# Patient Record
Sex: MALE | Race: WHITE | NOT HISPANIC OR LATINO | Employment: OTHER | URBAN - METROPOLITAN AREA
[De-identification: names, ages, dates, MRNs, and addresses within clinical notes are randomized per-mention and may not be internally consistent; named-entity substitution may affect disease eponyms.]

---

## 2018-05-30 ENCOUNTER — TRANSFERRED RECORDS (OUTPATIENT)
Dept: HEALTH INFORMATION MANAGEMENT | Facility: CLINIC | Age: 63
End: 2018-05-30

## 2018-12-12 ENCOUNTER — TRANSFERRED RECORDS (OUTPATIENT)
Dept: HEALTH INFORMATION MANAGEMENT | Facility: CLINIC | Age: 63
End: 2018-12-12

## 2019-09-04 ENCOUNTER — TRANSFERRED RECORDS (OUTPATIENT)
Dept: HEALTH INFORMATION MANAGEMENT | Facility: CLINIC | Age: 64
End: 2019-09-04

## 2020-02-03 ENCOUNTER — TRANSFERRED RECORDS (OUTPATIENT)
Dept: HEALTH INFORMATION MANAGEMENT | Facility: CLINIC | Age: 65
End: 2020-02-03

## 2020-02-12 ENCOUNTER — TRANSCRIBE ORDERS (OUTPATIENT)
Dept: OTHER | Age: 65
End: 2020-02-12

## 2020-02-12 DIAGNOSIS — M79.673 CHRONIC FOOT PAIN: Primary | ICD-10-CM

## 2020-02-12 DIAGNOSIS — Q66.229 METATARSUS ADDUCTUS: ICD-10-CM

## 2020-02-12 DIAGNOSIS — G89.29 CHRONIC FOOT PAIN: Primary | ICD-10-CM

## 2020-02-12 DIAGNOSIS — M65.972 TENOSYNOVITIS OF LEFT FOOT: ICD-10-CM

## 2020-02-19 ENCOUNTER — DOCUMENTATION ONLY (OUTPATIENT)
Dept: ORTHOPEDICS | Facility: CLINIC | Age: 65
End: 2020-02-19

## 2020-02-19 NOTE — PROGRESS NOTES
Strong possibility of duplicate chart with [phone]. Appropriate staff has been notified to merge accounts.

## 2020-02-21 ENCOUNTER — TRANSCRIBE ORDERS (OUTPATIENT)
Dept: OTHER | Age: 65
End: 2020-02-21

## 2020-02-21 DIAGNOSIS — M65.4 RADIAL STYLOID TENOSYNOVITIS OF LEFT HAND: ICD-10-CM

## 2020-02-21 DIAGNOSIS — Q66.229 METATARSUS ADDUCTUS: ICD-10-CM

## 2020-02-21 DIAGNOSIS — M79.673 CHRONIC FOOT PAIN, UNSPECIFIED LATERALITY: Primary | ICD-10-CM

## 2020-02-21 DIAGNOSIS — G89.29 CHRONIC FOOT PAIN, UNSPECIFIED LATERALITY: Primary | ICD-10-CM

## 2020-02-27 ENCOUNTER — DOCUMENTATION ONLY (OUTPATIENT)
Dept: CARE COORDINATION | Facility: CLINIC | Age: 65
End: 2020-02-27

## 2020-03-20 ENCOUNTER — TELEPHONE (OUTPATIENT)
Dept: ORTHOPEDICS | Facility: CLINIC | Age: 65
End: 2020-03-20

## 2020-03-20 NOTE — TELEPHONE ENCOUNTER
I called the patient this morning and LVM to let the patient know that he will need to reschedule his appointment out reschedule 5-8 weeks due to the COVID 19 virus. I gave him our number to call and reschedule.    Lynn Peña, ATC

## 2020-04-22 ENCOUNTER — TELEPHONE (OUTPATIENT)
Dept: ORTHOPEDICS | Facility: CLINIC | Age: 65
End: 2020-04-22

## 2020-04-22 NOTE — TELEPHONE ENCOUNTER
Called patient to offer sooner video visit with Dr. Corona. LVM to call us back.    Change to sooner video visit.    Lynn Peña, ATC

## 2020-04-23 NOTE — TELEPHONE ENCOUNTER
Called the patient back to discuss visit. Patient reports that he has previously seen 4 other podiatrist thus far. He is wondering if a video visit is even worth the time. I discussed what we can offer via video. Had an in depth conversation with patient wife who is retired Physical Therapist. After long discussion he decided to wait till he could have an in person visit. He will keep in mind that he can do a video visit at any point if he would like.    Lynn Peña, ATC

## 2020-04-23 NOTE — TELEPHONE ENCOUNTER
[facility] Call Center    Phone Message    May a detailed message be left on voicemail: yes     Reason for Call: Other: Pt is requesting a call back please. He is wanting to discuss if a video visit would be truly helpful for his case since Dr. Corona will be the 5th podiatrist he is seeing for this. Please reach out to Pt to discuss.      Action Taken: Message routed to:  [facility] ([facility] Ortho    Travel Screening: Not Applicable

## 2020-05-29 ENCOUNTER — VIRTUAL VISIT (OUTPATIENT)
Dept: ORTHOPEDICS | Facility: CLINIC | Age: 65
End: 2020-05-29
Attending: FAMILY MEDICINE
Payer: COMMERCIAL

## 2020-05-29 ENCOUNTER — TELEPHONE (OUTPATIENT)
Dept: PODIATRY | Facility: CLINIC | Age: 65
End: 2020-05-29

## 2020-05-29 DIAGNOSIS — G57.81 SURAL NEURITIS, RIGHT: Primary | ICD-10-CM

## 2020-05-29 DIAGNOSIS — G57.81 NEURITIS OF RIGHT SURAL NERVE: Primary | ICD-10-CM

## 2020-05-29 RX ORDER — ATORVASTATIN CALCIUM 40 MG/1
40 TABLET, FILM COATED ORAL
COMMUNITY
Start: 2019-06-13

## 2020-05-29 RX ORDER — ATENOLOL 50 MG/1
TABLET ORAL
COMMUNITY
Start: 2020-01-21

## 2020-05-29 RX ORDER — AMLODIPINE BESYLATE 2.5 MG/1
TABLET ORAL
COMMUNITY
Start: 2019-04-12

## 2020-05-29 RX ORDER — ALLOPURINOL 300 MG/1
TABLET ORAL
COMMUNITY
Start: 2020-04-24

## 2020-05-29 NOTE — TELEPHONE ENCOUNTER
5/29 Called and left voicemail. Provided phone number [phone] to schedule emg on June 22nd in [place]. Patient also needs to schedule follow up with Dr. Corona on June 24.     Alee Ansari   Procedure    Ortho/Sports Med/Ent/Eye   [facility]   [phone]  ----- Message -----  From: Parker Corona DPM  Sent: 5/29/2020  11:25 AM CDT  To: Alee Ansari    For the above patient, can you help them get scheduled for an EMG at [facility] and put them, on my [facility] schedule on June 24th?    Thanks!    AC

## 2020-05-29 NOTE — PROGRESS NOTES
"Ramana Kearney is a 64 year old male who is being evaluated via a billable video visit.      The patient has been notified of following:     \"This video visit will be conducted via a call between you and your physician/provider. We have found that certain health care needs can be provided without the need for an in-person physical exam.  This service lets us provide the care you need with a video conversation.  If a prescription is necessary we can send it directly to your pharmacy.  If lab work is needed we can place an order for that and you can then stop by our lab to have the test done at a later time.    Video visits are billed at different rates depending on your insurance coverage.  Please reach out to your insurance provider with any questions.    If during the course of the call the physician/provider feels a video visit is not appropriate, you will not be charged for this service.\"    Patient has given verbal consent for Video visit? Yes    How would you like to obtain your AVS? Mail a copy    Patient would like the video invitation sent by: email: [email or web address]    Will anyone else be joining your video visit? No        Video-Visit Details    Type of service:  Video Visit    Video Start Time: 1000  Video End Time: 10:44 AM    Originating Location (pt. Location): Home    Distant Location (provider location):  [facility]     Platform used for Video Visit: Vijay Corona DPM    Date of Service: 5/29/2020    Chief Complaint:   Chief Complaint   Patient presents with     Appointment     RIght foot pain 5th metatarsal and cuboid pain         HPI: Ramana is a 64 year old male who presents today via video with his wife with right foot pain. He has a complicated history with this foot. Relates [place] miles into the journey when he started to get foot pain in the right. He continued on some, but " was forced to come home. Relates that after that, he saw multiple doctors and had multiple diagnoses. He has had numerous injections into the foot for neuromas and joint pain. He has had PT on the feet. Relates that these helped somewhat, but he continues to have persistent pain in the right cuboid area. Relates that PT was manipulating the cuboid and this would relieve the pain x 12 hours and then it would return. He described the pain as sharp/dull/burning/electric.     Review of Systems: No n/v/d/f/c/nd/sob/cp     PMH: No past medical history on file.    PSxH: No past surgical history on file.    Allergies: Soy allergy    SH:   Social History     Socioeconomic History     Marital status:      Spouse name: Not on file     Number of children: Not on file     Years of education: Not on file     Highest education level: Not on file   Occupational History     Not on file   Social Needs     Financial resource strain: Not on file     Food insecurity     Worry: Not on file     Inability: Not on file     Transportation needs     Medical: Not on file     Non-medical: Not on file   Tobacco Use     Smoking status: Not on file   Substance and Sexual Activity     Alcohol use: Not on file     Drug use: Not on file     Sexual activity: Not on file   Lifestyle     Physical activity     Days per week: Not on file     Minutes per session: Not on file     Stress: Not on file   Relationships     Social connections     Talks on phone: Not on file     Gets together: Not on file     Attends Alevism service: Not on file     Active member of club or organization: Not on file     Attends meetings of clubs or organizations: Not on file     Relationship status: Not on file     Intimate partner violence     Fear of current or ex partner: Not on file     Emotionally abused: Not on file     Physically abused: Not on file     Forced sexual activity: Not on file   Other Topics Concern     Not on file   Social History Narrative     Not on  file       FH: No family history on file.    Objective:  He notes pain in the base of the cuboid, laterally. With percussion of the area, he has + tinel's and Valleix sign. No pain with 5th MTPJ passive or active ROM.   No pain with percussion of the common peroneal nerve. Pain with foot eversion. Some back pain.     No x-rays indicated during today's visit  Previous films were reviewed today, independent visualization of images was performed, and results were discussed with the patient      Assessment: Ramana is a 65 yo male, previously very active with pain in the right foot that has not responded to treatment. I reviewed his pervious images and he does not appear to have a mechanical or anatomical problem. Possibly Sural neuritis. Will order EMG.      Plan:  - Pt seen and evaluated  - Previous studies reviewed.  - Will order EMG.  - See again in [facility] s/p EMG.

## 2020-06-22 ENCOUNTER — OFFICE VISIT (OUTPATIENT)
Dept: NEUROLOGY | Facility: CLINIC | Age: 65
End: 2020-06-22
Payer: COMMERCIAL

## 2020-06-22 DIAGNOSIS — G57.81 NEURITIS OF RIGHT SURAL NERVE: ICD-10-CM

## 2020-06-22 DIAGNOSIS — M79.671 RIGHT FOOT PAIN: Primary | ICD-10-CM

## 2020-06-22 PROCEDURE — 95885 MUSC TST DONE W/NERV TST LIM: CPT | Performed by: PSYCHIATRY & NEUROLOGY

## 2020-06-22 PROCEDURE — 95911 NRV CNDJ TEST 9-10 STUDIES: CPT | Performed by: PSYCHIATRY & NEUROLOGY

## 2020-06-22 NOTE — PROGRESS NOTES
[facility] EMG Laboratory      Nerve Conduction & EMG Report          Patient:       Ramana Kearney  Patient ID:    [id number]  Gender:        Male  YOB: 1955  Age:           64 Years 8 Months      History and Examination:  Ramana Kearney is a 64 year old man with sharp pain over the lateral aspect of the left foot, exacerbated by pressure or weight-bearing. Examination recapitulates this history. Achilles reflexes are present. He also has a past history of intermittent foot numbness with prolonged running as well as cervical spine injury, with good recovery. He is referred for evaluation of possible sural neuropathy or other local neurogenic etiology of his symptoms.    Techniques:  Motor conduction studies were done with surface recording electrodes. Sensory conduction studies were performed with surface electrodes, unless indicated otherwise by (n), designating the use of subdermal recording electrodes. Temperature was monitored and recorded throughout the study. Lower extremities were maintained at a temperature of 31 degrees Centigrade or higher. EMG was done with a concentric needle electrode.     Results:  Bilateral sural and superficial peroneal sensory conduction studies were normal. Medial and lateral plantar nerve action potentials were absent bilaterally. Right peroneal and tibial motor conduction studies were normal. The right tibial minimum F-response latency was normal. Electromyography of right intrinsic foot muscles was normal.     Interpretation:  This study demonstrates no diagnostic abnormalities; in particular:  1. There is no evidence of sural neuropathy or other mononeuropathy.  2. 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  Furthermore its relevance in the context of a unilateral syndrome is doubtful.       Otis Mendoza MD        Sensory NCS      Nerve / Sites Rec. Site Onset Peak NP Amp Ref. PP Amp Dist Zacarias Ref. Temp     ms ms  V  V  V cm m/s m/s  C   R SURAL - Lat Mall 60      Calf Ankle 2.81 3.80 8.8 5.0 6.2 14 49.8  32.2   L SURAL - Lat Mall 60      Calf Ankle 3.07 3.85 8.9 5.0 6.7 14 45.6 38.0 31.2      Calf Ankle 2.97 3.85 10.0 5.0 5.4 14 47.2  31.1   R SUP PERONEAL      Lat Leg [place] 2.60 3.28 3.4  3.2 12.5 48.0 38.0 32.2   L SUP PERONEAL      Lat Leg [place] 3.23 3.96 3.2  2.5 12.5 38.7 38.0 32.2   R LATERAL PLANTAR      Sole Med Mall NR NR NR  NR 14 NR  32.2   L LATERAL PLANTAR      Sole Med Mall NR NR NR  NR 14 NR  29.6   R MEDIAL PLANTAR      Sole Med Mall NR NR NR  NR 14 NR  32.1   L MEDIAL PLANTAR      Sole Med Mall NR NR NR  NR 14 NR  31.9       Motor NCS      Nerve / Sites Rec. Site Lat Ref. Amp Ref. Rel Amp Dist Zacarias Ref. Dur. Area Temp.     ms ms mV mV % cm m/s m/s ms %  C   R DEEP PERONEAL - EDB 60      Ankle EDB 4.74 6.00 4.8 2.0 100 8   5.21 100 32      FibHead EDB 13.80  4.8  101 37 40.8 38.0 6.25 91.4 32      Pop Fos EDB 15.99  4.6  97 10 45.7 38.0 5.62 84.9 32.1   R TIBIAL - AH      Ankle AH 4.11 6.00 5.1 4.0 100 8   5.31 100 32.2      Pop Fos AH 13.91  3.7  73.5 40 40.9 38.0 7.81 91.2 32.2       F  Wave      Nerve Min F Lat Max F Lat Mean FLat Temp.    ms ms ms  C   R TIBIAL 51.56 54.22 52.99 32.2       EMG Summary Table     Spontaneous MUAP Recruitment    IA Fib PSW Fasc H.F. Amp Dur. PPP Pattern   R. ABD DIG MIN (LL) N None None None None N N N N   R. ABD HALLUCIS N None None None None N N N N   R. D INTEROSS (LL) N None None None None N N N N

## 2020-06-22 NOTE — LETTER
6/22/2020         RE: Ramana Kearney  [address]  [place] [address]        Dear Colleague,    Thank you for referring your patient, Ramana Kearney, to the [facility]. Please see a copy of my visit note below.    [facility] EMG Laboratory      Nerve Conduction & EMG Report          Patient:       Ramana Kearney  Patient ID:    [id number]  Gender:        Male  YOB: 1955  Age:           64 Years 8 Months      History and Examination:  Ramana Kearney is a 64 year old man with sharp pain over the lateral aspect of the left foot, exacerbated by pressure or weight-bearing. Examination recapitulates this history. Achilles reflexes are present. He also has a past history of intermittent foot numbness with prolonged running as well as cervical spine injury, with good recovery. He is referred for evaluation of possible sural neuropathy or other local neurogenic etiology of his symptoms.    Techniques:  Motor conduction studies were done with surface recording electrodes. Sensory conduction studies were performed with surface electrodes, unless indicated otherwise by (n), designating the use of subdermal recording electrodes. Temperature was monitored and recorded throughout the study. Lower extremities were maintained at a temperature of 31 degrees Centigrade or higher. EMG was done with a concentric needle electrode.     Results:  Bilateral sural and superficial peroneal sensory conduction studies were normal. Medial and lateral plantar nerve action potentials were absent bilaterally. Right peroneal and tibial motor conduction studies were normal. The right tibial minimum F-response latency was normal. Electromyography of right intrinsic foot muscles was normal.     Interpretation:  This study demonstrates no diagnostic abnormalities; in particular:  1. There is no evidence of sural neuropathy or other mononeuropathy.  2. 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 Furthermore its relevance in the context of a unilateral syndrome is doubtful.       Otis Mendoza MD        Sensory NCS      Nerve / Sites Rec. Site Onset Peak NP Amp Ref. PP Amp Dist Zacarias Ref. Temp     ms ms  V  V  V cm m/s m/s  C   R SURAL - Lat Mall 60      Calf Ankle 2.81 3.80 8.8 5.0 6.2 14 49.8  32.2   L SURAL - Lat Mall 60      Calf Ankle 3.07 3.85 8.9 5.0 6.7 14 45.6 38.0 31.2      Calf Ankle 2.97 3.85 10.0 5.0 5.4 14 47.2  31.1   R SUP PERONEAL      Lat Leg Dunham 2.60 3.28 3.4  3.2 12.5 48.0 38.0 32.2   L SUP PERONEAL      Lat Leg Dunham 3.23 3.96 3.2  2.5 12.5 38.7 38.0 32.2   R LATERAL PLANTAR      Sole Med Mall NR NR NR  NR 14 NR  32.2   L LATERAL PLANTAR      Sole Med Mall NR NR NR  NR 14 NR  29.6   R MEDIAL PLANTAR      Sole Med Mall NR NR NR  NR 14 NR  32.1   L MEDIAL PLANTAR      Sole Med Mall NR NR NR  NR 14 NR  31.9       Motor NCS      Nerve / Sites Rec. Site Lat Ref. Amp Ref. Rel Amp Dist Zacarias Ref. Dur. Area Temp.     ms ms mV mV % cm m/s m/s ms %  C   R DEEP PERONEAL - EDB 60      Ankle EDB 4.74 6.00 4.8 2.0 100 8   5.21 100 32      FibHead EDB 13.80  4.8  101 37 40.8 38.0 6.25 91.4 32      Pop Fos EDB 15.99  4.6  97 10 45.7 38.0 5.62 84.9 32.1   R TIBIAL - AH      Ankle AH 4.11 6.00 5.1 4.0 100 8   5.31 100 32.2      Pop Fos AH 13.91  3.7  73.5 40 40.9 38.0 7.81 91.2 32.2       F  Wave      Nerve Min F Lat Max F Lat Mean FLat Temp.    ms ms ms  C   R TIBIAL 51.56 54.22 52.99 32.2       EMG Summary Table     Spontaneous MUAP Recruitment    IA Fib PSW Fasc H.F. Amp Dur. PPP Pattern   R. ABD DIG MIN (LL) N None None None None N N N N   R. ABD HALLUCIS N None None None None N N N N   R. D INTEROSS (LL) N None None None None N N N N                                   Again, thank you for allowing me to participate in the care of your patient.        Sincerely,        Otis Mendoza MD

## 2020-06-23 NOTE — PROGRESS NOTES
Do you have any of the following symptoms:  a)      Fever (or reported chills) No  b)      Shortness of Breath No  c)      Rash No  d)      Cough in the last 14 days No    If a patient reports yes to any of these symptoms, obtain direction from the provider and call the patient back to let them know if they can come in or not.  1.    Provider needs to determine if this patient should still be seen in clinic.  2.    If decision to not see in clinic, call patient back and refer them to COVID- 19 [email or web address] or schedule COVID-19 phone visit.  3.    Turn in-person visit into telephone visit (FOR RETURN PATIENTS ONLY)    Remind patients that visitors are not allowed on site. Only one legal guardian who screens negative to the above questions will be allowed to accompany patients. If a patient indicates that they will be bringing a legal guardian with them to the appointment please make sure to screen both the patient and the legal guardian for symptoms using the tool above.      Chief Complaint:   Chief Complaint   Patient presents with     RECHECK     Right foot pain - EMG completed 6/22/2020          Allergies   Allergen Reactions     Soy Allergy Other (See Comments) and Anaphylaxis     Eye and throat swelling. Occurred ~15 minutes after drinking soy milk for the first time.      [place]          Subjective: Ramana is a 64 year old male who presents to the clinic today for a follow up of right foot pain. He relates that he had the EMG performed. Has pain in the foot that is unchanged.     Objective  Data Unavailable 65 Data Unavailable 141/83 Data Unavailable 0 lbs 0 oz  Pain is noted today with palpation of all aspects of the right cuboid. Normal ROM of the cuboid without dislocation. No pain with palpation of the PL, except at the cuboid. MMT 5/5. Some pain noted at the base of the 4th and 4th metatarsals.   No pain in the area with tuning fork. However, he does have pain in the area when the tuning fork is placed on  the tarsal tunnel.   MMT is 5/5.     EMG Interpretation:  This study demonstrates no diagnostic abnormalities; in particular:  1. There is no evidence of sural neuropathy or other mononeuropathy.  2. 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 Furthermore its relevance in the context of a unilateral syndrome is doubtful.         Otis Mendoza MD    Assessment: Right foot pain of unknown origin.     Plan:   - Pt seen and evaluated  - I discussed the case with Dr. Hall, my med ortho colleague. Possible neuritis still. He does have an MRI which showed a possible capsular tear of the 5th met cuneiform. I would like to rule this out first. Will write for arthrogram. If capsule is intact, will send to see Dr. Hall for diagnostic hydrodissection consideration.   - Pt to return to clinic virtually, s/p arthrogram.

## 2020-06-24 ENCOUNTER — OFFICE VISIT (OUTPATIENT)
Dept: PODIATRY | Facility: CLINIC | Age: 65
End: 2020-06-24
Payer: COMMERCIAL

## 2020-06-24 ENCOUNTER — ANCILLARY PROCEDURE (OUTPATIENT)
Dept: GENERAL RADIOLOGY | Facility: CLINIC | Age: 65
End: 2020-06-24
Attending: PODIATRIST
Payer: COMMERCIAL

## 2020-06-24 VITALS — DIASTOLIC BLOOD PRESSURE: 83 MMHG | HEART RATE: 65 BPM | OXYGEN SATURATION: 96 % | SYSTOLIC BLOOD PRESSURE: 141 MMHG

## 2020-06-24 DIAGNOSIS — M79.671 RIGHT FOOT PAIN: ICD-10-CM

## 2020-06-24 DIAGNOSIS — M79.671 RIGHT FOOT PAIN: Primary | ICD-10-CM

## 2020-06-24 DIAGNOSIS — T14.8XXA JOINT CAPSULE TEAR: ICD-10-CM

## 2020-06-24 PROCEDURE — 72100 X-RAY EXAM L-S SPINE 2/3 VWS: CPT | Performed by: RADIOLOGY

## 2020-06-24 PROCEDURE — 99213 OFFICE O/P EST LOW 20 MIN: CPT | Performed by: PODIATRIST

## 2020-06-24 PROCEDURE — 73630 X-RAY EXAM OF FOOT: CPT | Mod: RT | Performed by: RADIOLOGY

## 2020-06-24 RX ORDER — ASPIRIN 81 MG/1
81 TABLET ORAL DAILY
COMMUNITY

## 2020-06-24 ASSESSMENT — PAIN SCALES - GENERAL: PAINLEVEL: EXTREME PAIN (8)

## 2020-06-24 NOTE — NURSING NOTE
Ramana Kearney's chief complaint for this visit includes:  Chief Complaint   Patient presents with     RECHECK     Right foot pain - EMG completed 6/22/2020     PCP: Ramana Waldron    Referring Provider:  No referring provider defined for this encounter.    BP (!) 141/83 (BP Location: Right arm, Patient Position: Sitting, Cuff Size: Adult Regular)   Pulse 65   SpO2 96%   Extreme Pain (8)     Do you need any medication refills at today's visit? NO    Sarina Fernandez, [facility]

## 2020-06-24 NOTE — LETTER
6/24/2020         RE: Ramana Kearney  [address]  [place] [address]        Dear Colleague,    Thank you for referring your patient, Ramana Kearney, to the [facility]. Please see a copy of my visit note below.    Do you have any of the following symptoms:  a)      Fever (or reported chills) No  b)      Shortness of Breath No  c)      Rash No  d)      Cough in the last 14 days No    If a patient reports yes to any of these symptoms, obtain direction from the provider and call the patient back to let them know if they can come in or not.  1.    Provider needs to determine if this patient should still be seen in clinic.  2.    If decision to not see in clinic, call patient back and refer them to COVID- 19 [email or web address] or schedule COVID-19 phone visit.  3.    Turn in-person visit into telephone visit (FOR RETURN PATIENTS ONLY)    Remind patients that visitors are not allowed on site. Only one legal guardian who screens negative to the above questions will be allowed to accompany patients. If a patient indicates that they will be bringing a legal guardian with them to the appointment please make sure to screen both the patient and the legal guardian for symptoms using the tool above.      Chief Complaint:   Chief Complaint   Patient presents with     RECHECK     Right foot pain - EMG completed 6/22/2020          Allergies   Allergen Reactions     Soy Allergy Other (See Comments) and Anaphylaxis     Eye and throat swelling. Occurred ~15 minutes after drinking soy milk for the first time.      [place]          Subjective: Ramana is a 64 year old male who presents to the clinic today for a follow up of right foot pain. He relates that he had the EMG performed. Has pain in the foot that is unchanged.     Objective  Data Unavailable 65 Data Unavailable 141/83 Data Unavailable 0 lbs 0 oz  Pain is noted today with palpation of all aspects of the right cuboid. Normal ROM of the cuboid  without dislocation. No pain with palpation of the PL, except at the cuboid. MMT 5/5. Some pain noted at the base of the 4th and 4th metatarsals.   No pain in the area with tuning fork. However, he does have pain in the area when the tuning fork is placed on the tarsal tunnel.   MMT is 5/5.     EMG Interpretation:  This study demonstrates no diagnostic abnormalities; in particular:  1. There is no evidence of sural neuropathy or other mononeuropathy.  2. 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 Furthermore its relevance in the context of a unilateral syndrome is doubtful.         Otis Mendoza MD    Assessment: Right foot pain of unknown origin.     Plan:   - Pt seen and evaluated  - I discussed the case with Dr. Hall, my med ortho colleague. Possible neuritis still. He does have an MRI which showed a possible capsular tear of the 5th met cuneiform. I would like to rule this out first. Will write for arthrogram. If capsule is intact, will send to see Dr. Hall for diagnostic hydrodissection consideration.   - Pt to return to clinic virtually, s/p arthrogram.           Again, thank you for allowing me to participate in the care of your patient.        Sincerely,        Parker oCrona DPM

## 2020-06-25 DIAGNOSIS — Z11.59 ENCOUNTER FOR SCREENING FOR OTHER VIRAL DISEASES: Primary | ICD-10-CM

## 2020-06-26 ENCOUNTER — TELEPHONE (OUTPATIENT)
Dept: PODIATRY | Facility: CLINIC | Age: 65
End: 2020-06-26

## 2020-06-26 NOTE — TELEPHONE ENCOUNTER
[facility] Call Center    Phone Message    May a detailed message be left on voicemail: no     Reason for Call: Other: Dept sent order for arthrogram to [facility] in [place].  Pt's insurance needs a PA to be started before test can be performed.      Action Taken: Message routed to:  Adult Clinics: Podiatry p [id number]    Travel Screening:

## 2020-06-29 NOTE — TELEPHONE ENCOUNTER
Spoke with patient. He will reach out to [facility] regarding prior authorization.

## 2020-07-21 ENCOUNTER — TRANSFERRED RECORDS (OUTPATIENT)
Dept: HEALTH INFORMATION MANAGEMENT | Facility: CLINIC | Age: 65
End: 2020-07-21

## 2020-07-24 ENCOUNTER — VIRTUAL VISIT (OUTPATIENT)
Dept: ORTHOPEDICS | Facility: CLINIC | Age: 65
End: 2020-07-24
Payer: COMMERCIAL

## 2020-07-24 DIAGNOSIS — G57.81 SURAL NEURITIS, RIGHT: Primary | ICD-10-CM

## 2020-07-24 NOTE — PROGRESS NOTES
"Ramana Kearney is a 64 year old male who is being evaluated via a billable video visit.      The patient has been notified of following:     \"This video visit will be conducted via a call between you and your physician/provider. We have found that certain health care needs can be provided without the need for an in-person physical exam.  This service lets us provide the care you need with a video conversation.  If a prescription is necessary we can send it directly to your pharmacy.  If lab work is needed we can place an order for that and you can then stop by our lab to have the test done at a later time.    Video visits are billed at different rates depending on your insurance coverage.  Please reach out to your insurance provider with any questions.    If during the course of the call the physician/provider feels a video visit is not appropriate, you will not be charged for this service.\"    Patient has given verbal consent for Video visit? Yes  How would you like to obtain your AVS? MyChart  If you are dropped from the video visit, the video invite should be resent to: Send to e-mail at: [email or web address]  Will anyone else be joining your video visit? No    Video Visit Technology for this patient: Vijay Video Visit- Please send an invite to patient to [email or web address] or  phone number       Video-Visit Details    Type of service:  Video Visit    Video Start Time: 1000  Video End Time: 1015    Originating Location (pt. Location): Home    Distant Location (provider location):  [facility]     Platform used for Video Visit: Vijay Cornoa DPM    Chief Complaint:   Chief Complaint   Patient presents with     Follow Up          Allergies   Allergen Reactions     Soy Allergy Other (See Comments) and Anaphylaxis     Eye and throat swelling. Occurred ~15 minutes after drinking soy milk for the first time.      [place]          Subjective: Ramana is a 64 year old " male who presents to the clinic today for a follow up of right foot pain. He did have the arthrogram performed. Relates that the pain continues without changes.     Objective  Data Unavailable Data Unavailable Data Unavailable Data Unavailable Data Unavailable 0 lbs 0 oz  Pain in the right lateral cuboid that is described as nerve type pain.     Arthrogram FINDINGS:  Dilute gadolinium contrast was administered within the cuboid   metatarsal joint.  On the axial T1 weighted images there does appear to be   some connection of the dilute gadolinium to the third and second TMT   joints as well as into the navicular cuneiform joint.  This is likely   normal.  However there is some slight extravasation of gadolinium along   the lateral aspect of the shaft of the second metatarsal.    The bone marrow signal appears normal through the visualized midfoot and   forefoot.  The dorsal and ventral tendons appear unremarkable.  No focal   mass lesion is seen.  The interosseous ligaments between the cuneiform   bones appear to be intact.    IMPRESSION:      1. There is no fracture or marrow edema seen.  No focal bony injury is   seen.    2. There does appear to be some mild contrast extravasation along the   proximal and mid shaft of the second metatarsal.  The dilute gadolinium   injection into the fourth and fifth TMT joints is otherwise unremarkable.      Ramana Alexandre MD    Assessment: Ramana continues to have pain in the right lateral foot. As previously documented, I spoke to Dr. Hall about this case and we discussed possible hydrodissection. I will reacquaint Dr. Hall with the case on Wednesday and help him get scheduled.     Plan:   - Pt seen and evaluated  - F/U with Dr. Hall.   - Pt to return to clinic PRN.

## 2020-07-24 NOTE — LETTER
"    7/24/2020         RE: Ramana Kearney  [address] [place] [address]        Dear Colleague,    Thank you for referring your patient, Ramana Kearney, to the [facility]. Please see a copy of my visit note below.    Ramana Kearney is a 64 year old male who is being evaluated via a billable video visit.      The patient has been notified of following:     \"This video visit will be conducted via a call between you and your physician/provider. We have found that certain health care needs can be provided without the need for an in-person physical exam.  This service lets us provide the care you need with a video conversation.  If a prescription is necessary we can send it directly to your pharmacy.  If lab work is needed we can place an order for that and you can then stop by our lab to have the test done at a later time.    Video visits are billed at different rates depending on your insurance coverage.  Please reach out to your insurance provider with any questions.    If during the course of the call the physician/provider feels a video visit is not appropriate, you will not be charged for this service.\"    Patient has given verbal consent for Video visit? Yes  How would you like to obtain your AVS? MyChart  If you are dropped from the video visit, the video invite should be resent to: Send to e-mail at: [email or web address]  Will anyone else be joining your video visit? No    Video Visit Technology for this patient: Vijay Video Visit- Please send an invite to patient to [email or web address] or  phone number       Video-Visit Details    Type of service:  Video Visit    Video Start Time: 1000  Video End Time: 1015    Originating Location (pt. Location): Home    Distant Location (provider location):  [facility]     Platform used for Video Visit: Vijay Corona DPM    Chief Complaint:   Chief Complaint   Patient presents with     " Follow Up          Allergies   Allergen Reactions     Soy Allergy Other (See Comments) and Anaphylaxis     Eye and throat swelling. Occurred ~15 minutes after drinking soy milk for the first time.      [place]          Subjective: Ramana is a 64 year old male who presents to the clinic today for a follow up of right foot pain. He did have the arthrogram performed. Relates that the pain continues without changes.     Objective  Data Unavailable Data Unavailable Data Unavailable Data Unavailable Data Unavailable 0 lbs 0 oz  Pain in the right lateral cuboid that is described as nerve type pain.     Arthrogram FINDINGS:  Dilute gadolinium contrast was administered within the cuboid   metatarsal joint.  On the axial T1 weighted images there does appear to be   some connection of the dilute gadolinium to the third and second TMT   joints as well as into the navicular cuneiform joint.  This is likely   normal.  However there is some slight extravasation of gadolinium along   the lateral aspect of the shaft of the second metatarsal.    The bone marrow signal appears normal through the visualized midfoot and   forefoot.  The dorsal and ventral tendons appear unremarkable.  No focal   mass lesion is seen.  The interosseous ligaments between the cuneiform   bones appear to be intact.    IMPRESSION:      1. There is no fracture or marrow edema seen.  No focal bony injury is   seen.    2. There does appear to be some mild contrast extravasation along the   proximal and mid shaft of the second metatarsal.  The dilute gadolinium   injection into the fourth and fifth TMT joints is otherwise unremarkable.      Ramana Alexandre MD    Assessment: Ramana continues to have pain in the right lateral foot. As previously documented, I spoke to Dr. Hall about this case and we discussed possible hydrodissection. I will reacquaint Dr. Hall with the case on Wednesday and help him get scheduled.     Plan:   - Pt seen and evaluated  - F/U with  Dr. Hall.   - Pt to return to clinic PRN.       Again, thank you for allowing me to participate in the care of your patient.        Sincerely,        Parker Corona DPM

## 2020-08-17 ENCOUNTER — OFFICE VISIT (OUTPATIENT)
Dept: ORTHOPEDICS | Facility: CLINIC | Age: 65
End: 2020-08-17
Payer: COMMERCIAL

## 2020-08-17 DIAGNOSIS — G57.81 SURAL NEURITIS, RIGHT: ICD-10-CM

## 2020-08-17 PROCEDURE — 99207 ZZC NO CHARGE INJECTABLE MED/DRUG: CPT | Performed by: FAMILY MEDICINE

## 2020-08-17 PROCEDURE — 64450 NJX AA&/STRD OTHER PN/BRANCH: CPT | Mod: RT | Performed by: FAMILY MEDICINE

## 2020-08-17 PROCEDURE — 76942 ECHO GUIDE FOR BIOPSY: CPT | Performed by: FAMILY MEDICINE

## 2020-08-17 NOTE — LETTER
8/17/2020         RE: Ramana Kearney  [address] [facility] [address]        Dear Colleague,    Thank you for referring your patient, Ramana Kearney, to the [facility]. Please see a copy of my visit note below.          [facility]  8/17/2020    Ramana Kearney's chief complaint for this visit includes:  Chief Complaint   Patient presents with     Consult     Right foot pain, possible hydro disection procedure      PCP: Ramana Waldron    Referring Provider:  Parker Corona DPM  [address] [place] [id number]    There were no vitals taken for this visit.  Data Unavailable       Reason for visit:     What part of your body is injured / painful?  right foot    What caused the injury /pain? Hiking,     How long ago did your injury occur or pain begin? several years ago    What are your most bothersome symptoms? Pain    How would you characterize your symptom?  aching    What makes your symptoms better? Rest    What makes your symptoms worse? Movement    Have you been previously seen for this problem? Yes,     Medical History:    Any recent changes to your medical history? No    Any new medication prescribed since last visit? No    Have you had surgery on this body part before? No    Social History:    Occupation: Retired     Handedness: Right      Review of Systems:    Do you have fever, chills, weight loss? No    Do you have any vision problems? No    Do you have any chest pain or edema? No    Do you have any shortness of breath or wheezing?  No    Do you have stomach problems? No    Do you have any urinary track issues? No    Do you have any numbness or focal weakness? No    Do you have diabetes? No    Do you have problems with bleeding or clotting? No    Do you have an rashes or other skin lesions? No       CHIEF COMPLAINT:  Consult (Right foot pain, possible hydro disection procedure )       HISTORY OF PRESENT ILLNESS  Mr. Kearney is  a pleasant 64 year old male who presents to clinic today with a chronic right foot issue.  Boyd is seen at the request of Dr. Corona. Boyd has a history of right foot pain that dates back to 2018.  He was hiking [place] [address] between [place] and [place] when he suffered a severe inversion ankle injury of his right foot.  This was quite painful, he had thought he had possibly broken a bone.  He points to his general lateral foot region.  Unfortunately this has been a chronic problem for him.  He feels pain in a very specific spot near the base of his fifth metatarsal.  This is burning, radiates down to the plantar aspect of his foot, and is quite focal.  This is also very tender to the touch, even light touch well because symptoms to radiate down his foot.  He denies radiation from his back down his leg, no hip pain, he did have a right hamstring and generalized muscle injury that resolved with physical therapy. He had a recent ankle injection that did not help his pain.    Unfortunately Boyd has seen 6 physicians in the past couple of years, his symptoms persist.  He has had a recent MRI of his foot and an EMG.    Additional history: as documented    MEDICAL HISTORY  There is no problem list on file for this patient.      Current Outpatient Medications   Medication Sig Dispense Refill     allopurinol (ZYLOPRIM) 300 MG tablet TAKE 1 TABLET BY MOUTH EVERY DAY       amLODIPine (NORVASC) 2.5 MG tablet TAKE 1 TABLET BY MOUTH EVERY DAY       aspirin 81 MG EC tablet Take 81 mg by mouth daily       atenolol (TENORMIN) 50 MG tablet TAKE 1/2 TABLET BY MOUTH EVERY DAY       atorvastatin (LIPITOR) 40 MG tablet Take 40 mg by mouth         Allergies   Allergen Reactions     Soy Allergy Other (See Comments) and Anaphylaxis     Eye and throat swelling. Occurred ~15 minutes after drinking soy milk for the first time.      [place]        No family history on file.    Additional medical/Social/Surgical histories reviewed in [facility]  and updated as appropriate.        PHYSICAL EXAM      General  - normal appearance, in no obvious distress  HEENT  - conjunctivae not injected, moist mucous membranes  CV  - normal pulses at posterior tib and dorsalis pedis  Pulm  - normal respiratory pattern, non-labored  Musculoskeletal - right foot  - stance: gait slightly affects right leg  - inspection: no swelling or effusion, normal bone and joint alignment, no obvious deformity  - palpation: burning pain with very light palpation of lateral foot at and just proximal to base of MT5  - ROM: normal active and passive ROM of great and lesser toes, no pain with MT translation  - strength: 5/5 in all planes  Neuro  - no sensory or motor deficit, grossly normal coordination, normal muscle tone  Skin  - no ecchymosis, erythema, warmth, or induration, no obvious rash  Psych  - interactive, appropriate, normal mood and affect           ASSESSMENT & PLAN  Mr. Kearney is a 64 year old male who presents to clinic today with chronic right foot pain.    I reviewed his imaging in the room with him, this was an MR arthrogram to rule out capsular joint tears of the fourth and fifth metatarsals.  This showed no obvious issues at this area.  His EMG reveals no obvious nerve entrapment, he does have changes consistent with a mild neuropathy.    After long discussion, through shared decision making we did perform a hydrodissection of a cutaneous branch of the sural nerve (see procedure note).    Boyd is going to follow up next week.    In the future we could consider working up his lumbar spine, although this does seem less likely, trying a medication for nerve pain, such as gabapentin, or foot orthotics.    It was a pleasure seeing Boyd today.    Aside to and separate from the procedure approximately 40 minutes was spent in the room, discussing the above.       Otis Hall DO, [facility]  Primary Care Sports Medicine      This note was constructed using Dragon dictation software,  please excuse any minor errors in spelling, grammar, or syntax.      Hydrodissection - Sural Nerve    Painful area was marked and identified at the lateral foot, just proximal to and at the base of the fifth metatarsal on the right foot.  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  Approximately 10 cc of lidocaine was drawn into a 10 cc syringe.  Under ultrasound guidance a 22-gauge needle was used to infiltrate lidocaine, planing of the nerve was performed under ultrasound guidance.  This was documented using ultrasound, images were stored for the patient's record.    Again, thank you for allowing me to participate in the care of your patient.        Sincerely,        Otis Hall, DO

## 2020-08-17 NOTE — PROGRESS NOTES
[facility]  8/17/2020    Ramana Kearney's chief complaint for this visit includes:  Chief Complaint   Patient presents with     Consult     Right foot pain, possible hydro disection procedure      PCP: Ramana Waldron    Referring Provider:  Parker Corona, [facility]  [address] [place] [address]    There were no vitals taken for this visit.  Data Unavailable       Reason for visit:     What part of your body is injured / painful?  right foot    What caused the injury /pain? Hiking,     How long ago did your injury occur or pain begin? several years ago    What are your most bothersome symptoms? Pain    How would you characterize your symptom?  aching    What makes your symptoms better? Rest    What makes your symptoms worse? Movement    Have you been previously seen for this problem? Yes,     Medical History:    Any recent changes to your medical history? No    Any new medication prescribed since last visit? No    Have you had surgery on this body part before? No    Social History:    Occupation: Retired     Handedness: Right      Review of Systems:    Do you have fever, chills, weight loss? No    Do you have any vision problems? No    Do you have any chest pain or edema? No    Do you have any shortness of breath or wheezing?  No    Do you have stomach problems? No    Do you have any urinary track issues? No    Do you have any numbness or focal weakness? No    Do you have diabetes? No    Do you have problems with bleeding or clotting? No    Do you have an rashes or other skin lesions? No       CHIEF COMPLAINT:  Consult (Right foot pain, possible hydro disection procedure )       HISTORY OF PRESENT ILLNESS  Mr. Kearney is a pleasant 64 year old male who presents to clinic today with a chronic right foot issue.  Boyd is seen at the request of Dr. Corona. Boyd has a history of right foot pain that dates back to 2018.  He was [place] [address] between [place]  when he suffered a severe inversion ankle injury of his right foot.  This was quite painful, he had thought he had possibly broken a bone.  He points to his general lateral foot region.  Unfortunately this has been a chronic problem for him.  He feels pain in a very specific spot near the base of his fifth metatarsal.  This is burning, radiates down to the plantar aspect of his foot, and is quite focal.  This is also very tender to the touch, even light touch well because symptoms to radiate down his foot.  He denies radiation from his back down his leg, no hip pain, he did have a right hamstring and generalized muscle injury that resolved with physical therapy. He had a recent ankle injection that did not help his pain.    Unfortunately Boyd has seen 6 physicians in the past couple of years, his symptoms persist.  He has had a recent MRI of his foot and an EMG.    Additional history: as documented    MEDICAL HISTORY  There is no problem list on file for this patient.      Current Outpatient Medications   Medication Sig Dispense Refill     allopurinol (ZYLOPRIM) 300 MG tablet TAKE 1 TABLET BY MOUTH EVERY DAY       amLODIPine (NORVASC) 2.5 MG tablet TAKE 1 TABLET BY MOUTH EVERY DAY       aspirin 81 MG EC tablet Take 81 mg by mouth daily       atenolol (TENORMIN) 50 MG tablet TAKE 1/2 TABLET BY MOUTH EVERY DAY       atorvastatin (LIPITOR) 40 MG tablet Take 40 mg by mouth         Allergies   Allergen Reactions     Soy Allergy Other (See Comments) and Anaphylaxis     Eye and throat swelling. Occurred ~15 minutes after drinking soy milk for the first time.      [place]        No family history on file.    Additional medical/Social/Surgical histories reviewed in [facility] and updated as appropriate.        PHYSICAL EXAM      General  - normal appearance, in no obvious distress  HEENT  - conjunctivae not injected, moist mucous membranes  CV  - normal pulses at posterior tib and dorsalis pedis  Pulm  - normal respiratory  pattern, non-labored  Musculoskeletal - right foot  - stance: gait slightly affects right leg  - inspection: no swelling or effusion, normal bone and joint alignment, no obvious deformity  - palpation: burning pain with very light palpation of lateral foot at and just proximal to base of MT5  - ROM: normal active and passive ROM of great and lesser toes, no pain with MT translation  - strength: 5/5 in all planes  Neuro  - no sensory or motor deficit, grossly normal coordination, normal muscle tone  Skin  - no ecchymosis, erythema, warmth, or induration, no obvious rash  Psych  - interactive, appropriate, normal mood and affect           ASSESSMENT & PLAN  Mr. Kearney is a 64 year old male who presents to clinic today with chronic right foot pain.    I reviewed his imaging in the room with him, this was an MR arthrogram to rule out capsular joint tears of the fourth and fifth metatarsals.  This showed no obvious issues at this area.  His EMG reveals no obvious nerve entrapment, he does have changes consistent with a mild neuropathy.    After long discussion, through shared decision making we did perform a hydrodissection of a cutaneous branch of the sural nerve (see procedure note).    Boyd is going to follow up next week.    In the future we could consider working up his lumbar spine, although this does seem less likely, trying a medication for nerve pain, such as gabapentin, or foot orthotics.    It was a pleasure seeing Boyd today.    Aside to and separate from the procedure approximately 40 minutes was spent in the room, discussing the above.       Otis Hall DO, [facility]  Primary Care [facility] Medicine      This note was constructed using Dragon dictation software, please excuse any minor errors in spelling, grammar, or syntax.      Hydrodissection - Sural Nerve    Painful area was marked and identified at the lateral foot, just proximal to and at the base of the fifth metatarsal on the right foot.  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  Approximately 10 cc of lidocaine was drawn into a 10 cc syringe.  Under ultrasound guidance a 22-gauge needle was used to infiltrate lidocaine, planing of the nerve was performed under ultrasound guidance.  This was documented using ultrasound, images were stored for the patient's record.

## 2020-08-24 ENCOUNTER — OFFICE VISIT (OUTPATIENT)
Dept: ORTHOPEDICS | Facility: CLINIC | Age: 65
End: 2020-08-24
Payer: COMMERCIAL

## 2020-08-24 DIAGNOSIS — M79.671 CHRONIC PAIN OF BOTH FEET: ICD-10-CM

## 2020-08-24 DIAGNOSIS — G89.29 CHRONIC PAIN OF BOTH FEET: ICD-10-CM

## 2020-08-24 DIAGNOSIS — M79.672 CHRONIC PAIN OF BOTH FEET: ICD-10-CM

## 2020-08-24 DIAGNOSIS — G57.81 SURAL NEURITIS, RIGHT: Primary | ICD-10-CM

## 2020-08-24 PROCEDURE — 64450 NJX AA&/STRD OTHER PN/BRANCH: CPT | Performed by: FAMILY MEDICINE

## 2020-08-24 PROCEDURE — 99207 ZZC DROP WITH A PROCEDURE: CPT | Performed by: FAMILY MEDICINE

## 2020-08-24 PROCEDURE — 76942 ECHO GUIDE FOR BIOPSY: CPT | Performed by: FAMILY MEDICINE

## 2020-08-24 NOTE — LETTER
8/24/2020         RE: Ramana Kearney  [address] [facility] [address]        Dear Colleague,    Thank you for referring your patient, Ramana Kearney, to the [facility]. Please see a copy of my visit note below.    HISTORY OF PRESENT ILLNESS  Mr. 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  Boyd saw me last week for a [place] dissection at a cutaneous branch of his terminal sural nerve at his right lateral foot.  This helped him immensely immediately, he felt immediately better upon leaving the office.  Unfortunately he only feels about 20% better overall.  The pain has spread up his foot somewhat.     PHYSICAL EXAM  General  - normal appearance, in no obvious distress  HEENT  - conjunctivae not injected, moist mucous membranes  CV  - normal pulses at posterior tib and dorsalis pedis  Pulm  - normal respiratory pattern, non-labored  Musculoskeletal - right foot  - stance: normal gait without limp  - inspection: no swelling or effusion  - palpation: markedly tender along the sural nerve at lateral foot, also along cutaneous branch of sural nerve at lateral to dorsal midfoot  Neuro  - normal muscle tone  Skin  - no ecchymosis, erythema, warmth, or induration, no obvious rash  Psych  - interactive, appropriate, normal mood and affect        ASSESSMENT & PLAN  Mr. 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    I had a long discussion with his centering around treatment moving forward.  I do feel encouraged by his improvement, even though his symptoms are only 20% improved.    Through shared decision making we did decide to repeat his [place] dissection today, this at 2 different spots along the branch of the sural nerve (see procedure note).    Boyd does have a follow-up with Dr. Corona, I do recommend that he keep this appointment.    Boyd is going to follow-up with me in 2 weeks.  I did order  orthotics to be made at that visit.    It was a pleasure seeing Boyd.    Aside to and separate from the procedure, approximately 25 minutes was spent discussing the above      Otis Hall DO, TARA    Hydrodissection - Sural Nerve     Painful areas (2) were marked and identified at the lateral and dorsal midfoot.  First site is just proximal to and at the base of the fifth metatarsal on the right foot.  Second sit is directly superficial to the 4th TMT junction.  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  Approximately 10 cc of lidocaine was drawn into a 10 cc syringe.  Under ultrasound guidance a 22-gauge needle was used to infiltrate lidocaine, planing of the nerve was performed under ultrasound guidance.  5cc were used.  This identical procedure was duplicated for the second site.  This was documented using ultrasound, images were stored for the patient's record.      This note was constructed using Dragon dictation software, please excuse any minor errors in spelling, grammar, or syntax.          [facility] FOLLOW-UP VISIT 8/24/2020    Ramana Kearney's chief complaint for this visit includes:  Chief Complaint   Patient presents with     RECHECK     f/u after right foot hydro disection, about 20% better, felt great with the lidocaine      PCP: Ramana Waldron    Referring Provider:  Parker Corona DPM  [address] [place] [id number]      Interval History:     Follow up reason: right foot     Following Therapeutic Plan: Yes     Pain: Unchanged    Function: Unchanged      Medical History:    Any recent changes to your medical history? No    Any new medication prescribed since last visit? No    Review of Systems:    Do you have fever, chills, weight loss? No    Do you have any vision problems? No    Do you have any chest pain or edema? No    Do you have any shortness of breath or wheezing?   No    Do you have stomach problems? No    Do you have any urinary track issues? No    Do you have any numbness or focal weakness? No    Do you have diabetes? No    Do you have problems with bleeding or clotting? No    Do you have an rashes or other skin lesions? No      Again, thank you for allowing me to participate in the care of your patient.        Sincerely,        Otis Hall, DO

## 2020-08-24 NOTE — PROGRESS NOTES
HISTORY OF PRESENT ILLNESS  Mr. 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  Boyd saw me last week for a [place] dissection at a cutaneous branch of his terminal sural nerve at his right lateral foot.  This helped him immensely immediately, he felt immediately better upon leaving the office.  Unfortunately he only feels about 20% better overall.  The pain has spread up his foot somewhat.     PHYSICAL EXAM  General  - normal appearance, in no obvious distress  HEENT  - conjunctivae not injected, moist mucous membranes  CV  - normal pulses at posterior tib and dorsalis pedis  Pulm  - normal respiratory pattern, non-labored  Musculoskeletal - right foot  - stance: normal gait without limp  - inspection: no swelling or effusion  - palpation: markedly tender along the sural nerve at lateral foot, also along cutaneous branch of sural nerve at lateral to dorsal midfoot  Neuro  - normal muscle tone  Skin  - no ecchymosis, erythema, warmth, or induration, no obvious rash  Psych  - interactive, appropriate, normal mood and affect        ASSESSMENT & PLAN  Mr. 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    I had a long discussion with his centering around treatment moving forward.  I do feel encouraged by his improvement, even though his symptoms are only 20% improved.    Through shared decision making we did decide to repeat his [place] dissection today, this at 2 different spots along the branch of the sural nerve (see procedure note).    Boyd does have a follow-up with Dr. Corona, I do recommend that he keep this appointment.    Boyd is going to follow-up with me in 2 weeks.  I did order orthotics to be made at that visit.    It was a pleasure seeing Boyd.    Aside to and separate from the procedure, approximately 25 minutes was spent discussing the above      Otis Hall DO, TARA    Hydrodissection - Sural Nerve     Painful areas (2) were  marked and identified at the lateral and dorsal midfoot.  First site is just proximal to and at the base of the fifth metatarsal on the right foot.  Second sit is directly superficial to the 4th TMT junction.  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  Approximately 10 cc of lidocaine was drawn into a 10 cc syringe.  Under ultrasound guidance a 22-gauge needle was used to infiltrate lidocaine, planing of the nerve was performed under ultrasound guidance.  5cc were used.  This identical procedure was duplicated for the second site.  This was documented using ultrasound, images were stored for the patient's record.      This note was constructed using Dragon dictation software, please excuse any minor errors in spelling, grammar, or syntax.          [facility] FOLLOW-UP VISIT 8/24/2020    Ramana Kearney's chief complaint for this visit includes:  Chief Complaint   Patient presents with     RECHECK     f/u after right foot hydro disection, about 20% better, felt great with the lidocaine      PCP: Ramana Waldron    Referring Provider:  Parker Corona DPM  [address] [place] [id number]      Interval History:     Follow up reason: right foot     Following Therapeutic Plan: Yes     Pain: Unchanged    Function: Unchanged      Medical History:    Any recent changes to your medical history? No    Any new medication prescribed since last visit? No    Review of Systems:    Do you have fever, chills, weight loss? No    Do you have any vision problems? No    Do you have any chest pain or edema? No    Do you have any shortness of breath or wheezing?  No    Do you have stomach problems? No    Do you have any urinary track issues? No    Do you have any numbness or focal weakness? No    Do you have diabetes? No    Do you have problems with bleeding or clotting? No    Do you have an rashes or other skin lesions?  No

## 2020-08-25 ENCOUNTER — TELEPHONE (OUTPATIENT)
Dept: ORTHOPEDICS | Facility: CLINIC | Age: 65
End: 2020-08-25

## 2020-08-25 NOTE — TELEPHONE ENCOUNTER
8/25 Provided phone number [phone] to schedule follow up around 9/7/20.     Alee Ansari   Procedure    Ortho/Sports Med/Pod/Ent/Eye/Surgical Specialties  [facility] [place]   [phone]

## 2020-09-08 ENCOUNTER — OFFICE VISIT (OUTPATIENT)
Dept: ORTHOPEDICS | Facility: CLINIC | Age: 65
End: 2020-09-08
Payer: MEDICARE

## 2020-09-08 DIAGNOSIS — G57.81 SURAL NEURITIS, RIGHT: Primary | ICD-10-CM

## 2020-09-08 PROCEDURE — 99213 OFFICE O/P EST LOW 20 MIN: CPT | Performed by: FAMILY MEDICINE

## 2020-09-08 NOTE — LETTER
9/8/2020         RE: Ramana Kearney  [address]  [facility] [address]        Dear Colleague,    Thank you for referring your patient, Ramana Kearney, to the [facility]. Please see a copy of my visit note below.    HISTORY OF PRESENT ILLNESS  Mr. Kearney is a pleasant 64 year old male following up with right foot pain.  Boyd underwent a second [place] dissection for presumed sural nerve entrapment in his right lateral and right forefoot.  Boyd got great relief with these injections, unfortunately this only lasted about 2 hours.  His pain then returned and has been constant since this point.  No new events, no other complaints or concerns.  He is reasonably frustrated by his pain.    ASSESSMENT & PLAN  Mr. Kearney is a 64 year old male following up with a likely sural nerve entrapment of his right foot.    I had a long discussion with Boyd centering around his pain and an appropriate plan.  I do think that the fact that his pain resolved completely with Hydro dissection attempts on 2 separate occasions confirms his likely diagnosis of a sural nerve entrapment.  Unfortunately my skill set limits him to these treatments under my care.    I am referring him to our pain specialists at the [facility] and consideration of interventional procedures that may help him.    This may include consideration for a possible radiofrequency ablation, sural nerve release, or other procedure.    In the meantime Boyd is going to follow-up with Dr. Corona for orthotics, which I do think is a good idea.    It was a pleasure seeing Boyd.    20 minutes was spent in the room, more than half of which was spent on counseling and coordination of care.        Otis Hall, , CAQSM      This note was constructed using Dragon dictation software, please excuse any minor errors in spelling, grammar, or syntax.        [facility] FOLLOW-UP VISIT 9/8/2020    Ramana Kearney's  chief complaint for this visit includes:  Chief Complaint   Patient presents with     RECHECK     f/u foot injection, helped for 2 hours after the injection      PCP: Ramana Waldron      Interval History:     Follow up reason: foot injection     Medical History:    Any recent changes to your medical history? No    Any new medication prescribed since last visit? No    Review of Systems:    Do you have fever, chills, weight loss? No    Do you have any vision problems? No    Do you have any chest pain or edema? No    Do you have any shortness of breath or wheezing?  No    Do you have stomach problems? No    Do you have any urinary track issues? No    Do you have any numbness or focal weakness? No    Do you have diabetes? No    Do you have problems with bleeding or clotting? No    Do you have an rashes or other skin lesions? No      Again, thank you for allowing me to participate in the care of your patient.        Sincerely,        Otis Hall, DO

## 2020-09-08 NOTE — PROGRESS NOTES
HISTORY OF PRESENT ILLNESS  Mr. Kearney is a pleasant 64 year old male following up with right foot pain.  Boyd underwent a second [place] dissection for presumed sural nerve entrapment in his right lateral and right forefoot.  Boyd got great relief with these injections, unfortunately this only lasted about 2 hours.  His pain then returned and has been constant since this point.  No new events, no other complaints or concerns.  He is reasonably frustrated by his pain.    ASSESSMENT & PLAN  Mr. Kearney is a 64 year old male following up with a likely sural nerve entrapment of his right foot.    I had a long discussion with Boyd centering around his pain and an appropriate plan.  I do think that the fact that his pain resolved completely with Hydro dissection attempts on 2 separate occasions confirms his likely diagnosis of a sural nerve entrapment.  Unfortunately my skill set limits him to these treatments under my care.    I am referring him to our pain specialists at the [facility] and consideration of interventional procedures that may help him.    This may include consideration for a possible radiofrequency ablation, sural nerve release, or other procedure.    In the meantime Boyd is going to follow-up with Dr. Corona for orthotics, which I do think is a good idea.    It was a pleasure seeing Boyd.    20 minutes was spent in the room, more than half of which was spent on counseling and coordination of care.        Otis Hall DO, CAQSM      This note was constructed using Dragon dictation software, please excuse any minor errors in spelling, grammar, or syntax.        [facility] FOLLOW-UP VISIT 9/8/2020    Ramana Kearney's chief complaint for this visit includes:  Chief Complaint   Patient presents with     RECHECK     f/u foot injection, helped for 2 hours after the injection      PCP: Ramana Waldron      Interval History:     Follow up reason: foot injection     Medical  History:    Any recent changes to your medical history? No    Any new medication prescribed since last visit? No    Review of Systems:    Do you have fever, chills, weight loss? No    Do you have any vision problems? No    Do you have any chest pain or edema? No    Do you have any shortness of breath or wheezing?  No    Do you have stomach problems? No    Do you have any urinary track issues? No    Do you have any numbness or focal weakness? No    Do you have diabetes? No    Do you have problems with bleeding or clotting? No    Do you have an rashes or other skin lesions? No

## 2020-09-10 ENCOUNTER — TELEPHONE (OUTPATIENT)
Dept: ORTHOPEDICS | Facility: CLINIC | Age: 65
End: 2020-09-10

## 2020-09-10 NOTE — TELEPHONE ENCOUNTER
9/10 Provided phone number [phone] to schedule with the pain clinic at [facility].     Alee Ansari   Procedure    Ortho/Sports Med/Pod/Ent/Eye/Surgical Specialties  [facility] [place]   [phone]

## 2020-09-24 ENCOUNTER — TELEPHONE (OUTPATIENT)
Dept: ORTHOPEDICS | Facility: CLINIC | Age: 65
End: 2020-09-24

## 2020-09-24 DIAGNOSIS — G57.81 SURAL NEURITIS, RIGHT: Primary | ICD-10-CM

## 2020-09-24 NOTE — TELEPHONE ENCOUNTER
Health Call Center    Phone Message    May a detailed message be left on voicemail: yes     Reason for Call: Patient has made several attempts to schedule from a referral from Dr. Hall to the pain clinic and was advised that no referral was found. Please resubmit referral ASAP. Call patient back to advise new referral is entered.      Action Taken: Message routed to:  Adult Clinics: Sports Medicine p [id number]    Travel Screening: Not Applicable

## 2020-11-02 ENCOUNTER — OFFICE VISIT (OUTPATIENT)
Dept: ANESTHESIOLOGY | Facility: CLINIC | Age: 65
End: 2020-11-02
Attending: FAMILY MEDICINE
Payer: MEDICARE

## 2020-11-02 VITALS
SYSTOLIC BLOOD PRESSURE: 150 MMHG | WEIGHT: 256 LBS | HEIGHT: 72 IN | RESPIRATION RATE: 16 BRPM | BODY MASS INDEX: 34.67 KG/M2 | HEART RATE: 70 BPM | DIASTOLIC BLOOD PRESSURE: 88 MMHG

## 2020-11-02 DIAGNOSIS — G57.81 SURAL NEURITIS, RIGHT: ICD-10-CM

## 2020-11-02 PROCEDURE — 99204 OFFICE O/P NEW MOD 45 MIN: CPT | Mod: GC | Performed by: ANESTHESIOLOGY

## 2020-11-02 RX ORDER — GABAPENTIN 300 MG/1
1200 CAPSULE ORAL DAILY
Qty: 120 CAPSULE | Refills: 0 | Status: SHIPPED | OUTPATIENT
Start: 2020-11-02

## 2020-11-02 ASSESSMENT — ENCOUNTER SYMPTOMS
CONSTITUTIONAL NEGATIVE: 1
MUSCULOSKELETAL NEGATIVE: 1
CARDIOVASCULAR NEGATIVE: 1
NEUROLOGICAL NEGATIVE: 1
RESPIRATORY NEGATIVE: 1
EYES NEGATIVE: 1
PSYCHIATRIC NEGATIVE: 1
GASTROINTESTINAL NEGATIVE: 1

## 2020-11-02 ASSESSMENT — PAIN SCALES - GENERAL: PAINLEVEL: MILD PAIN (3)

## 2020-11-02 ASSESSMENT — MIFFLIN-ST. JEOR: SCORE: 1984.21

## 2020-11-02 NOTE — LETTER
11/2/2020       RE: Ramana Kearney  [address] [facility] [address]     Dear Colleague,    Thank you for referring your patient, Ramana Kearney, to the [facility] FOR COMPREHENSIVE PAIN MANAGEMENT [place] at [facility]. Please see a copy of my visit note below.    Pain Clinic New Patient Consult Note:    Referring Provider: Rafael   Primary care provider: Ramana Waldron    Ramana Kearney is a 65 year old y.o. old male who presents to the pain clinic with chronic right foot pain.     HPI:  Patient Supplied Answers To the [facility] Pain Questionnaire  [facility] Pain -  Patient Entered Questionnaire/Answers 11/2/2020   What number best describes your pain right now:  0 = No pain  to  10 = Worst pain imaginable 3   How would you describe the pain? sharp   Which of the following worsen your pain? lying down, standing, sitting, walking, exercise   Which of the following improve or reduce your pain?  lying down, sitting   What number best describes your average pain for the past week:  0 = No pain  to  10 = Worst pain imaginable 3   What number best describes your LOWEST pain in past 24 hours:  0 = No pain  to  10 = Worst pain imaginable 3   What number best describes your WORST pain in past 24 hours:  0 = No pain  to  10 = Worst pain imaginable 3   When is your pain worst? PM, Night   What non-medicine treatments have you already had for your pain? physical therapy, other   Have you tried treating your pain with medication?  No   Are you currently taking medications for your pain? No     Patient reports pain started in May 2018 when he was on a hiking and sprained his ankle which lead to immediate pain and swelling.  He did PRICE therapy at that time and attempted to resume his trek but ultimately had too much pain and swelling.  After 2 weeks the swelling went down but the pain lingered and he had subsequent ongoing hyper sensitivity of the right  lateral foot.  He reports this pain progressively worsened over the course of a few months to a year and he was seeing specialists to help with diagnosis of his pain which more recently was diagnosed as sural neuritis.  He had 2 hydordilitation at the level of the ankle for sural neuritis with most recent being 3 months ago.  Initially it was not overly beneficial but more recently his pain has gone from a 9 to a 3 / 10 which he is at today.  He is tolerating walking more and has less hyperesthesias today.  He denies any other issues, no numbness, tingling, weakness, or back pain      Pain treatments:  Herbal medicines: none  Physical therapy: yes, and AT which helped   Chiropractor: no  Pain physician: no  Surgery: no  Biofeedback: no  Acupuncture: no     Tests/Imaging reviewed with the patient:  MRI of the foot     Significant Medical History:   HTN, Gout, HLD.      Past Surgical History:  No surgeries      Family history   None contributory     Social History:  Social History     Socioeconomic History     Marital status:      Spouse name: Not on file     Number of children: Not on file     Years of education: Not on file     Highest education level: Not on file   Occupational History     Not on file   Social Needs     Financial resource strain: Not on file     Food insecurity     Worry: Not on file     Inability: Not on file     Transportation needs     Medical: Not on file     Non-medical: Not on file   Tobacco Use     Smoking status: Never Smoker     Smokeless tobacco: Never Used   Substance and Sexual Activity     Alcohol use: Not on file     Drug use: Not on file     Sexual activity: Not on file   Lifestyle     Physical activity     Days per week: Not on file     Minutes per session: Not on file     Stress: Not on file   Relationships     Social connections     Talks on phone: Not on file     Gets together: Not on file     Attends Jainism service: Not on file     Active member of club or  organization: Not on file     Attends meetings of clubs or organizations: Not on file     Relationship status: Not on file     Intimate partner violence     Fear of current or ex partner: Not on file     Emotionally abused: Not on file     Physically abused: Not on file     Forced sexual activity: Not on file   Other Topics Concern     Not on file   Social History Narrative     Not on file     Social History     Social History Narrative     Not on file          Allergies:  Allergies   Allergen Reactions     Soy Allergy Other (See Comments) and Anaphylaxis     Eye and throat swelling. Occurred ~15 minutes after drinking soy milk for the first time.      [place]        Current Medications:   Current Outpatient Medications   Medication Sig Dispense Refill     allopurinol (ZYLOPRIM) 300 MG tablet TAKE 1 TABLET BY MOUTH EVERY DAY       amLODIPine (NORVASC) 2.5 MG tablet TAKE 1 TABLET BY MOUTH EVERY DAY       aspirin 81 MG EC tablet Take 81 mg by mouth daily       atenolol (TENORMIN) 50 MG tablet TAKE 1/2 TABLET BY MOUTH EVERY DAY       atorvastatin (LIPITOR) 40 MG tablet Take 40 mg by mouth       gabapentin (NEURONTIN) 300 MG capsule Take 4 capsules (1,200 mg) by mouth daily Start 1 cap at bedtime 7 days, then 2 cap at bedtime 7 days, 3 cap at bedtime 7 days then 4 cap at bedtime. 120 capsule 0        Current Pain Medications:  Medications related to Pain Management (From now, onward)    None           Past Pain Medications:  No pain medications at this time.     Blood thinner:  None      Work History:       Current work status: retired     Psychosocial History: none     History of treatment for behavioral disorder: none  History of suicidal ideation or suicidal attempt: none     Review of Systems:  Review of Systems   Constitutional: Negative.    HENT: Negative.    Eyes: Negative.    Respiratory: Negative.    Cardiovascular: Negative.    Gastrointestinal: Negative.    Genitourinary: Negative.     Musculoskeletal: Negative.    Skin: Negative.    Neurological: Negative.    Endo/Heme/Allergies: Negative.    Psychiatric/Behavioral: Negative.      Physical Exam:     Vitals:    11/02/20 1140   BP: (!) 150/88   Pulse: 70   Resp: 16   Weight: 116.1 kg (256 lb)   Height: 1.829 m (6')       General Appearance: No distress, seated comfortably  Mood: Euthymic  HE ENT: Non constricted pupils  Respiratory: Non labored breathing  CVS: Regular rate and rhythm  GI: Soft, non distended, no TTP  Skin: No rashes over exposed skin  MS/neuro: normal skin color and appearance of the right foot compared to the left.  He has no temperature difference based on touch.  He has full ROM of the right foot and has point tenderness at the lateral base of the 5th metatarsal which causes radiating pain over the foot.  Also area of hyperesthesia in the first webspace of the great toe and 2nd toe.  Normal sensation in the 2nd and 3rd webspace.   Strength is 5/5 with PF/DF/inversion and eversion  Gait: non antalgic, ambulates without assistance      Laboratory results:  No results for input(s): NA, POTASSIUM, CHLORIDE, CO2, ANIONGAP, GLC, BUN, CR, SHANTEL in the last 36810 hours.    CBC RESULTS: No results for input(s): WBC, RBC, HGB, HCT, MCV, MCH, MCHC, RDW, PLT in the last 26043 hours.    Imaging:  No images are attached to the encounter.     ASSESSMENT AND PLAN:     Encounter Diagnosis:  Sural neuritis     Ramana Kearney is a 65 year old y.o. 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  He has benefited from hydrodilitation procedure by Dr. Hall which has provided good relief of pain to now being at 3/10,      Patient's history, physical exam and imaging are suggestive of their pain is resulting from sural neuritis.     I have summarized the patient history, discussed their clinical findings and differential diagnosis with the  patient. I have discussed anatomy and possible sources of the pain using models and/or pictures(diagrams). I have discussed multi- disciplinary pain management options with the patient as pertaining to their case as detailed above. All questions and concerns were answered to the best of my ability.     RECOMMENDATIONS:     1. Medications: We are prescribing the patient gabapentin with initial dose of 300 mg at bedtime and titrating up every 7days to a target dose of 1200mg hs. Dosing, side effects, risks/benefits/alternatives were discussed with the patient in detail.    2. Procedure: No procedure is indicated at this time however we did discuss that if his pain were to worsen and not respond to hydrodiliation that we could consider peripheral nerve stimulator in the future.      3. Physical therapy: I have refered the patient for outpatient physical therapy for stretching, strengthening and home exercise program once he is able to tolerate exercise and ambulating. The patient will also discuss spine care and posture. Pool therapy and stretches can be considered if available.    4. Discussed in detail and educated about proper foot wear and possible orthotics to allow for relief of the lateral aspect of the foot and to take pressure off the sural nerve.      Follow up: as needed     ATTENDING ATTESTATION  I saw the patient along with the pain medicine fellow Dr. Ramana Steinberg. Please see his note above for full details. I have edited his note and agree with it. I was involved in gathering history, physical examination and development of the plan of care.     Shira Rawls MD LPN reviewed AVS with Pt.  Pt verbalized an understanding of information, and was asked to contact clinic with questions.    Follow up recommended by provider: As needed with Dr. Rawls.     Ella Davis LPN

## 2020-11-02 NOTE — PROGRESS NOTES
Pain Clinic New Patient Consult Note:    Referring Provider: Rafael   Primary care provider: Ramana Waldron Antonio Kearney is a 65 year old y.o. old male who presents to the pain clinic with chronic right foot pain.     HPI:  Patient Supplied Answers To the [facility] Pain Questionnaire  [facility] -  Patient Entered Questionnaire/Answers 11/2/2020   What number best describes your pain right now:  0 = No pain  to  10 = Worst pain imaginable 3   How would you describe the pain? sharp   Which of the following worsen your pain? lying down, standing, sitting, walking, exercise   Which of the following improve or reduce your pain?  lying down, sitting   What number best describes your average pain for the past week:  0 = No pain  to  10 = Worst pain imaginable 3   What number best describes your LOWEST pain in past 24 hours:  0 = No pain  to  10 = Worst pain imaginable 3   What number best describes your WORST pain in past 24 hours:  0 = No pain  to  10 = Worst pain imaginable 3   When is your pain worst? PM, Night   What non-medicine treatments have you already had for your pain? physical therapy, other   Have you tried treating your pain with medication?  No   Are you currently taking medications for your pain? No     Patient reports pain started in May 2018 when he was on a hiking and sprained his ankle which lead to immediate pain and swelling.  He did PRICE therapy at that time and attempted to resume his trek but ultimately had too much pain and swelling.  After 2 weeks the swelling went down but the pain lingered and he had subsequent ongoing hyper sensitivity of the right lateral foot.  He reports this pain progressively worsened over the course of a few months to a year and he was seeing specialists to help with diagnosis of his pain which more recently was diagnosed as sural neuritis.  He had 2 hydordilitation at the level of the ankle for sural neuritis with most recent being 3 months ago.  Initially it  was not overly beneficial but more recently his pain has gone from a 9 to a 3 / 10 which he is at today.  He is tolerating walking more and has less hyperesthesias today.  He denies any other issues, no numbness, tingling, weakness, or back pain      Pain treatments:  Herbal medicines: none  Physical therapy: yes, and AT which helped   Chiropractor: no  Pain physician: no  Surgery: no  Biofeedback: no  Acupuncture: no     Tests/Imaging reviewed with the patient:  MRI of the foot     Significant Medical History:   HTN, Gout, HLD.      Past Surgical History:  No surgeries      Family history   None contributory     Social History:  Social History     Socioeconomic History     Marital status:      Spouse name: Not on file     Number of children: Not on file     Years of education: Not on file     Highest education level: Not on file   Occupational History     Not on file   Social Needs     Financial resource strain: Not on file     Food insecurity     Worry: Not on file     Inability: Not on file     Transportation needs     Medical: Not on file     Non-medical: Not on file   Tobacco Use     Smoking status: Never Smoker     Smokeless tobacco: Never Used   Substance and Sexual Activity     Alcohol use: Not on file     Drug use: Not on file     Sexual activity: Not on file   Lifestyle     Physical activity     Days per week: Not on file     Minutes per session: Not on file     Stress: Not on file   Relationships     Social connections     Talks on phone: Not on file     Gets together: Not on file     Attends Lutheran service: Not on file     Active member of club or organization: Not on file     Attends meetings of clubs or organizations: Not on file     Relationship status: Not on file     Intimate partner violence     Fear of current or ex partner: Not on file     Emotionally abused: Not on file     Physically abused: Not on file     Forced sexual activity: Not on file   Other Topics Concern     Not on file    Social History Narrative     Not on file     Social History     Social History Narrative     Not on file          Allergies:  Allergies   Allergen Reactions     Soy Allergy Other (See Comments) and Anaphylaxis     Eye and throat swelling. Occurred ~15 minutes after drinking soy milk for the first time.      [place]        Current Medications:   Current Outpatient Medications   Medication Sig Dispense Refill     allopurinol (ZYLOPRIM) 300 MG tablet TAKE 1 TABLET BY MOUTH EVERY DAY       amLODIPine (NORVASC) 2.5 MG tablet TAKE 1 TABLET BY MOUTH EVERY DAY       aspirin 81 MG EC tablet Take 81 mg by mouth daily       atenolol (TENORMIN) 50 MG tablet TAKE 1/2 TABLET BY MOUTH EVERY DAY       atorvastatin (LIPITOR) 40 MG tablet Take 40 mg by mouth       gabapentin (NEURONTIN) 300 MG capsule Take 4 capsules (1,200 mg) by mouth daily Start 1 cap at bedtime 7 days, then 2 cap at bedtime 7 days, 3 cap at bedtime 7 days then 4 cap at bedtime. 120 capsule 0          Current Pain Medications:  Medications related to Pain Management (From now, onward)    None           Past Pain Medications:  No pain medications at this time.     Blood thinner:  None      Work History:       Current work status: retired     Psychosocial History: none     History of treatment for behavioral disorder: none  History of suicidal ideation or suicidal attempt: none     Review of Systems:  Review of Systems   Constitutional: Negative.    HENT: Negative.    Eyes: Negative.    Respiratory: Negative.    Cardiovascular: Negative.    Gastrointestinal: Negative.    Genitourinary: Negative.    Musculoskeletal: Negative.    Skin: Negative.    Neurological: Negative.    Endo/Heme/Allergies: Negative.    Psychiatric/Behavioral: Negative.          Physical Exam:     Vitals:    11/02/20 1140   BP: (!) 150/88   Pulse: 70   Resp: 16   Weight: 116.1 kg (256 lb)   Height: 1.829 m (6')       General Appearance: No distress, seated comfortably  Mood:  Euthymic  HE ENT: Non constricted pupils  Respiratory: Non labored breathing  CVS: Regular rate and rhythm  GI: Soft, non distended, no TTP  Skin: No rashes over exposed skin  MS/neuro: normal skin color and appearance of the right foot compared to the left.  He has no temperature difference based on touch.  He has full ROM of the right foot and has point tenderness at the lateral base of the 5th metatarsal which causes radiating pain over the foot.  Also area of hyperesthesia in the first webspace of the great toe and 2nd toe.  Normal sensation in the 2nd and 3rd webspace.   Strength is 5/5 with PF/DF/inversion and eversion  Gait: non antalgic, ambulates without assistance      Laboratory results:  No results for input(s): NA, POTASSIUM, CHLORIDE, CO2, ANIONGAP, GLC, BUN, CR, SHANTEL in the last 69459 hours.    CBC RESULTS: No results for input(s): WBC, RBC, HGB, HCT, MCV, MCH, MCHC, RDW, PLT in the last 45705 hours.      Imaging:  No images are attached to the encounter.     ASSESSMENT AND PLAN:     Encounter Diagnosis:  Sural neuritis     Ramana Kearney is a 65 year old y.o. 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  He has benefited from hydrodilitation procedure by Dr. Hall which has provided good relief of pain to now being at 3/10,      Patient's history, physical exam and imaging are suggestive of their pain is resulting from sural neuritis.     I have summarized the patient history, discussed their clinical findings and differential diagnosis with the patient. I have discussed anatomy and possible sources of the pain using models and/or pictures(diagrams). I have discussed multi- disciplinary pain management options with the patient as pertaining to their case as detailed above. All questions and concerns were answered to the best of my ability.     RECOMMENDATIONS:     1. Medications: We are  prescribing the patient gabapentin with initial dose of 300 mg at bedtime and titrating up every 7days to a target dose of 1200mg hs. Dosing, side effects, risks/benefits/alternatives were discussed with the patient in detail.    2. Procedure: No procedure is indicated at this time however we did discuss that if his pain were to worsen and not respond to hydrodiliation that we could consider peripheral nerve stimulator in the future.      3. Physical therapy: I have refered the patient for outpatient physical therapy for stretching, strengthening and home exercise program once he is able to tolerate exercise and ambulating. The patient will also discuss spine care and posture. Pool therapy and stretches can be considered if available.    4. Discussed in detail and educated about proper foot wear and possible orthotics to allow for relief of the lateral aspect of the foot and to take pressure off the sural nerve.      Follow up: as needed     ATTENDING ATTESTATION  I saw the patient along with the pain medicine fellow Dr. Ramana Steinberg. Please see his note above for full details. I have edited his note and agree with it. I was involved in gathering history, physical examination and development of the plan of care.

## 2020-11-02 NOTE — PROGRESS NOTES
LPN reviewed AVS with Pt.  Pt verbalized an understanding of information, and was asked to contact clinic with questions.    Follow up recommended by provider: As needed with Dr. Rawls.     Ella Davis LPN

## 2020-11-02 NOTE — PATIENT INSTRUCTIONS
Medications:    gabapentin (NEURONTIN) 300 MG capsule- Start 1 capsule at bedtime for 7 days.  Then increase 2 capsules at bedtime for 7 days.  Then increase to  3 capsules at bedtime 7 days.  Then increase to 4 capsules at bedtime. Continue at this dose.     Please provide the clinic with a minium of 1 week notice, on all prescription refills.     Treatment planning:    Recommendations will be written in the providers note for your Primary Care provider (OR other providers in your care team) to review and make changes to your therapies based on their discretion.      Recommended Follow up:      As needed with Dr. Rawls.        Please call [phone], option #1 to schedule your clinic appointment if you don't already have an appointment scheduled.    To speak with a nurse, schedule/reschedule/cancel a clinic appointment, or request a medication refill call: [phone], option #1.    You can also reach us by [facility]: [email or web address]

## 2020-11-16 ENCOUNTER — HEALTH MAINTENANCE LETTER (OUTPATIENT)
Age: 65
End: 2020-11-16

## 2021-02-01 ENCOUNTER — OFFICE VISIT (OUTPATIENT)
Dept: ORTHOPEDICS | Facility: CLINIC | Age: 66
End: 2021-02-01
Payer: MEDICARE

## 2021-02-01 ENCOUNTER — ANCILLARY PROCEDURE (OUTPATIENT)
Dept: GENERAL RADIOLOGY | Facility: CLINIC | Age: 66
End: 2021-02-01
Attending: PODIATRIST
Payer: MEDICARE

## 2021-02-01 DIAGNOSIS — G57.81 SURAL NEURITIS, RIGHT: ICD-10-CM

## 2021-02-01 DIAGNOSIS — M79.671 RIGHT FOOT PAIN: ICD-10-CM

## 2021-02-01 DIAGNOSIS — M79.671 RIGHT FOOT PAIN: Primary | ICD-10-CM

## 2021-02-01 PROCEDURE — 73630 X-RAY EXAM OF FOOT: CPT | Mod: RT | Performed by: RADIOLOGY

## 2021-02-01 PROCEDURE — 99215 OFFICE O/P EST HI 40 MIN: CPT | Performed by: PODIATRIST

## 2021-02-01 NOTE — PROGRESS NOTES
Chief Complaint:   Chief Complaint   Patient presents with     Follow Up     Intense burning pain, base of bilateral toes. R>L. Pain in lateral right foot has improved some.           Allergies   Allergen Reactions     Soy Allergy Other (See Comments) and Anaphylaxis     Eye and throat swelling. Occurred ~15 minutes after drinking soy milk for the first time.      [place]          Subjective: Ramana is a 65 year old male who presents to the clinic today for a follow up of right foot pain. HE relates that sine the last visit with me, he saw Dr. Hall who performed Hydro dissection of the sural nerve x2.  He was initially, this did not cause any relief.  However, he did start to get some more relief after this occurred.  He did see our pain management clinic.  They related that he could be considered for a stimulator in the future, however they want to try some gabapentin first.  He relates that he was taking gabapentin, and this was helping.  As he started to feel better, he started to go out walking more.  He relates that about 2 weeks ago, he started to have intense burning in the bottoms of both feet.  He relates that the pain is really centered where the toe meets the rest of the foot.  He describes the pain as intense burning.  The sural nerve pain is still better, however this new burning occurs whenever he is taking a step.  It does somewhat gene at night, however when he takes his first Starts to hurt again.    Objective  Data Unavailable Data Unavailable Data Unavailable Data Unavailable Data Unavailable 0 lbs 0 oz  DP and PT pulses are 2/4.  Capillary refill time is within normal limits.  Positive pedal hair.  Increase sensitivity noted to bilateral metatarsals at the distal heads.  Exquisite pain noted with palpation of the fourth metatarsal shaft on the right side.  No pain with any MTPJ range of motion or distraction.  Some pain noted with palpation of the head of the right second metatarsal when  palpated plantarly.  Some pain is also noted at the junction of the second, third, and fourth toes at the MTPJ's.    right foot xrays indicated in 3 weightbearing views.    No fractures. normal alignment. No soft tissue abnormality.      Assessment: Raamna is a 65-year-old with pain in the right foot.  He was previously diagnosed with sural neuritis.  His pain has improved.  He is now having pain in the midfoot on the right side it is in the second, third, and fourth MTPJ's.  He relates this is an intense burning pain.  He relates that this has stopped him from walking as much.  He started to get this pain after he exacerbated it with walking more and distance.  I discussed with him that I like to speak with pain management to see what they think about a peripheral nerve stimulator at this point.  We will also repeat his MRI to see if there is something musculoskeletal he that could be fixed.  For now, will start a cam boot on him.  I did trial a Budin splint and met pads, and these did not provide much relief.    Plan:   - Pt seen and evaluated  -Start a short cam boot on the right side.  -I did send a message to pain management to see if they could reassess him for a peripheral nerve stimulator.  -I did send an order for an MRI.  - Pt to return to clinic status post MRI.  I spent 45 minutes today reviewing the previous MRI from the summer, patient care, documentation, and collaborating with pain management.

## 2021-02-01 NOTE — LETTER
2/1/2021         RE: Ramana Kearney  [address]  [facility] [address]        Dear Colleague,    Thank you for referring your patient, Ramana Kearney, to the [facility] [place]. Please see a copy of my visit note below.    Chief Complaint:   Chief Complaint   Patient presents with     Follow Up     Intense burning pain, base of bilateral toes. R>L. Pain in lateral right foot has improved some.           Allergies   Allergen Reactions     Soy Allergy Other (See Comments) and Anaphylaxis     Eye and throat swelling. Occurred ~15 minutes after drinking soy milk for the first time.      [place]          Subjective: Ramana is a 65 year old male who presents to the clinic today for a follow up of right foot pain. HE relates that sine the last visit with me, he saw Dr. Hall who performed Hydro dissection of the sural nerve x2.  He was initially, this did not cause any relief.  However, he did start to get some more relief after this occurred.  He did see our pain management clinic.  They related that he could be considered for a stimulator in the future, however they want to try some gabapentin first.  He relates that he was taking gabapentin, and this was helping.  As he started to feel better, he started to go out walking more.  He relates that about 2 weeks ago, he started to have intense burning in the bottoms of both feet.  He relates that the pain is really centered where the toe meets the rest of the foot.  He describes the pain as intense burning.  The sural nerve pain is still better, however this new burning occurs whenever he is taking a step.  It does somewhat gene at night, however when he takes his first Starts to hurt again.    Objective  Data Unavailable Data Unavailable Data Unavailable Data Unavailable Data Unavailable 0 lbs 0 oz  DP and PT pulses are 2/4.  Capillary refill time is within normal limits.  Positive pedal hair.  Increase sensitivity noted  to bilateral metatarsals at the distal heads.  Exquisite pain noted with palpation of the fourth metatarsal shaft on the right side.  No pain with any MTPJ range of motion or distraction.  Some pain noted with palpation of the head of the right second metatarsal when palpated plantarly.  Some pain is also noted at the junction of the second, third, and fourth toes at the MTPJ's.    right foot xrays indicated in 3 weightbearing views.    No fractures. normal alignment. No soft tissue abnormality.      Assessment: Ramana is a 65-year-old with pain in the right foot.  He was previously diagnosed with sural neuritis.  His pain has improved.  He is now having pain in the midfoot on the right side it is in the second, third, and fourth MTPJ's.  He relates this is an intense burning pain.  He relates that this has stopped him from walking as much.  He started to get this pain after he exacerbated it with walking more and distance.  I discussed with him that I like to speak with pain management to see what they think about a peripheral nerve stimulator at this point.  We will also repeat his MRI to see if there is something musculoskeletal he that could be fixed.  For now, will start a cam boot on him.  I did trial a Budin splint and met pads, and these did not provide much relief.    Plan:   - Pt seen and evaluated  -Start a short cam boot on the right side.  -I did send a message to pain management to see if they could reassess him for a peripheral nerve stimulator.  -I did send an order for an MRI.  - Pt to return to clinic status post MRI.  I spent 45 minutes today reviewing the previous MRI from the summer, patient care, documentation, and collaborating with pain management.        Parker Corona DPM

## 2021-02-05 ENCOUNTER — TRANSFERRED RECORDS (OUTPATIENT)
Dept: HEALTH INFORMATION MANAGEMENT | Facility: CLINIC | Age: 66
End: 2021-02-05

## 2021-03-04 ENCOUNTER — DOCUMENTATION ONLY (OUTPATIENT)
Dept: CARE COORDINATION | Facility: CLINIC | Age: 66
End: 2021-03-04

## 2021-03-11 NOTE — PROGRESS NOTES
Chief Complaint   Patient presents with     RECHECK     1 month follow up             Allergies   Allergen Reactions     Soy Allergy Other (See Comments) and Anaphylaxis     Eye and throat swelling. Occurred ~15 minutes after drinking soy milk for the first time.      [place]          Subjective: Ramana is a 65 year old male who presents to the clinic today for a follow up of right foot pain. He has the MRI performed and no specific pathology was noted about the 4th met shaft. See scanned report.  He is with his wife today.  He relates that he is having pain when he pushes on the top of the right foot with a finger.  The top of the foot does not necessarily cause pain when he is not pushing on it.  He relates that when he is walking, he is having pain in the balls of the foot and in the sulcus of the toes on the right foot.  He relates that he does not have this pain when he is in the cam boot.  He did get a new pair of Hoka shoes and he does have reduced pain when wearing these, however it does still hurt him when he is walking in these.  He relates that he has thought about getting a pain stimulator, however does not want to do this.  This was presented to him at the last pain management meeting that was in November of last year.    Objective  Data Unavailable Data Unavailable Data Unavailable Data Unavailable Data Unavailable 0 lbs 0 oz  DP and PT pulses are 2/4.  Capillary refill time is within normal limits.  Positive pedal hair.  Increase sensitivity noted to bilateral metatarsals at the distal heads.  Exquisite pain noted with palpation of the fourth metatarsal shaft on the right side.  No pain with any MTPJ range of motion or distraction.   Some pain is also noted at the junction of the second, third, and fourth toes at the MTPJ's.  With palpation, he has pain at the dorsal aspect of the right second metatarsal head.  No pain in this area without palpation.  He does have some generalized and diffuse edema  of the right foot.  This was also shown on the MRI.    MRI was reviewed with him today.      Assessment: Ramana is a 65-year-old with pain in the right foot.  He was previously diagnosed with sural neuritis.  His pain has improved, however this is only when he is wearing the cam boot or the Hoka shoe.  He relates he does have pain when palpating the second metatarsal head on the right.  I have asked him to stop palpating the area.  He is having pain in the sulcus on the right foot.  With gait analysis, he does have very slight abductory moment at the end of stance phase on the right foot.  It does not appear that this would be enough to cause this amount of significant pain in the foot.      Plan:   - Pt seen and evaluated  -Cont Hoka shoe  - I went to speak to PT. They gave me the name of a PT to try in [place]. Could also be CRPS, but no note of that in pain's note.   - I did discuss possible stim again with him. He will consider this.   I spent 60 minutes today reviewing the MRI, patient care, documentation, and collaborating with PT, and gait analysis.

## 2021-03-12 ENCOUNTER — OFFICE VISIT (OUTPATIENT)
Dept: ORTHOPEDICS | Facility: CLINIC | Age: 66
End: 2021-03-12
Payer: MEDICARE

## 2021-03-12 DIAGNOSIS — M79.671 RIGHT FOOT PAIN: Primary | ICD-10-CM

## 2021-03-12 PROCEDURE — 99215 OFFICE O/P EST HI 40 MIN: CPT | Performed by: PODIATRIST

## 2021-03-12 NOTE — NURSING NOTE
Reason For Visit:   Chief Complaint   Patient presents with     RECHECK     1 month follow up        There were no vitals taken for this visit.    Pain Assessment  Patient Currently in Pain: Yes(says the foot has been feeling a little bit better.)  Primary Pain Location: Foot    Lynn Peña ATC

## 2021-03-12 NOTE — LETTER
3/12/2021         RE: Ramana Kearney  [address] [facility] [address]        Dear Colleague,    Thank you for referring your patient, Ramana Kearney, to the [facility] [place]. Please see a copy of my visit note below.    Chief Complaint   Patient presents with     RECHECK     1 month follow up             Allergies   Allergen Reactions     Soy Allergy Other (See Comments) and Anaphylaxis     Eye and throat swelling. Occurred ~15 minutes after drinking soy milk for the first time.      [place]          Subjective: Ramana is a 65 year old male who presents to the clinic today for a follow up of right foot pain. He has the MRI performed and no specific pathology was noted about the 4th met shaft. See scanned report.  He is with his wife today.  He relates that he is having pain when he pushes on the top of the right foot with a finger.  The top of the foot does not necessarily cause pain when he is not pushing on it.  He relates that when he is walking, he is having pain in the balls of the foot and in the sulcus of the toes on the right foot.  He relates that he does not have this pain when he is in the cam boot.  He did get a new pair of Hoka shoes and he does have reduced pain when wearing these, however it does still hurt him when he is walking in these.  He relates that he has thought about getting a pain stimulator, however does not want to do this.  This was presented to him at the last pain management meeting that was in November of last year.    Objective  Data Unavailable Data Unavailable Data Unavailable Data Unavailable Data Unavailable 0 lbs 0 oz  DP and PT pulses are 2/4.  Capillary refill time is within normal limits.  Positive pedal hair.  Increase sensitivity noted to bilateral metatarsals at the distal heads.  Exquisite pain noted with palpation of the fourth metatarsal shaft on the right side.  No pain with any MTPJ range of motion or  distraction.   Some pain is also noted at the junction of the second, third, and fourth toes at the MTPJ's.  With palpation, he has pain at the dorsal aspect of the right second metatarsal head.  No pain in this area without palpation.  He does have some generalized and diffuse edema of the right foot.  This was also shown on the MRI.    MRI was reviewed with him today.      Assessment: Ramana is a 65-year-old with pain in the right foot.  He was previously diagnosed with sural neuritis.  His pain has improved, however this is only when he is wearing the cam boot or the Hoka shoe.  He relates he does have pain when palpating the second metatarsal head on the right.  I have asked him to stop palpating the area.  He is having pain in the sulcus on the right foot.  With gait analysis, he does have very slight abductory moment at the end of stance phase on the right foot.  It does not appear that this would be enough to cause this amount of significant pain in the foot.      Plan:   - Pt seen and evaluated  -Cont Hoka shoe  - I went to speak to PT. They gave me the name of a PT to try in [place]. Could also be CRPS, but no note of that in pain's note.   - I did discuss possible stim again with him. He will consider this.   I spent 60 minutes today reviewing the MRI, patient care, documentation, and collaborating with PT, and gait analysis.         Parker Corona DPM

## 2021-03-30 ENCOUNTER — TRANSFERRED RECORDS (OUTPATIENT)
Dept: HEALTH INFORMATION MANAGEMENT | Facility: CLINIC | Age: 66
End: 2021-03-30

## 2021-04-22 ENCOUNTER — TRANSFERRED RECORDS (OUTPATIENT)
Dept: HEALTH INFORMATION MANAGEMENT | Facility: CLINIC | Age: 66
End: 2021-04-22

## 2021-04-23 ENCOUNTER — VIRTUAL VISIT (OUTPATIENT)
Dept: ORTHOPEDICS | Facility: CLINIC | Age: 66
End: 2021-04-23
Payer: MEDICARE

## 2021-04-23 DIAGNOSIS — M77.41 METATARSALGIA OF RIGHT FOOT: ICD-10-CM

## 2021-04-23 DIAGNOSIS — M79.671 RIGHT FOOT PAIN: Primary | ICD-10-CM

## 2021-04-23 PROCEDURE — 99214 OFFICE O/P EST MOD 30 MIN: CPT | Mod: 95 | Performed by: PODIATRIST

## 2021-04-23 NOTE — LETTER
4/23/2021         RE: Ramana Kearney  [address] [facility] [address]        Dear Colleague,    Thank you for referring your patient, Ramana Kearney, to the [facility]. Please see a copy of my visit note below.    Ramana is a 65 year old who is being evaluated via a billable video visit.      How would you like to obtain your AVS? MyChart  If the video visit is dropped, the invitation should be resent by: Text to cell phone: mobile  Will anyone else be joining your video visit? No      Video Start Time: 1520    Assessment & Plan     Right foot pain    - Orthotics, Prosthetics and Custom Compression Order for DME - ONLY FOR DME    Metatarsalgia of right foot    - Orthotics, Prosthetics and Custom Compression Order for DME - ONLY FOR DME      30 minutes spent on the date of the encounter doing chart review, review of outside records, patient visit and documentation            Dr. Hall for lumbar spine.     No follow-ups on file.    Parker Corona, DPM  [facility]    Subjective   Ramana is a 65 year old who presents for the following health issues  accompanied by his spouse:    HPI     Encounter Diagnoses   Name Primary?     Right foot pain Yes     Metatarsalgia of right foot      He relates that the pain has gotten slightly better with PT. Relates that he would like to try orthotics. PT thinks that some of this may be coming from his back. Pain under the 2nd met head.     Review of Systems   Constitutional, HEENT, cardiovascular, pulmonary, gi and gu systems are negative, except as otherwise noted.      Objective           Vitals:  No vitals were obtained today due to virtual visit.    Physical Exam   GENERAL: Healthy, alert and no distress  EYES: Eyes grossly normal to inspection.  No discharge or erythema, or obvious scleral/conjunctival abnormalities.  RESP: No audible wheeze, cough, or visible cyanosis.  No visible  retractions or increased work of breathing.    SKIN: Visible skin clear. No significant rash, abnormal pigmentation or lesions.  NEURO: Cranial nerves grossly intact.  Mentation and speech appropriate for age.  PSYCH: Mentation appears normal, affect normal/bright, judgement and insight intact, normal speech and appearance well-groomed.          Video-Visit Details    Type of service:  Video Visit    Video End Time:1540    Originating Location (pt. Location): Home    Distant Location (provider location):  [facility]     Platform used for Video Visit: Vijay Corona DPM

## 2021-04-23 NOTE — PROGRESS NOTES
Ramana is a 65 year old who is being evaluated via a billable video visit.      How would you like to obtain your AVS? MyChart  If the video visit is dropped, the invitation should be resent by: Text to cell phone: mobile  Will anyone else be joining your video visit? No      Video Start Time: 1520    Assessment & Plan     Right foot pain    - Orthotics, Prosthetics and Custom Compression Order for DME - ONLY FOR DME    Metatarsalgia of right foot    - Orthotics, Prosthetics and Custom Compression Order for DME - ONLY FOR DME      30 minutes spent on the date of the encounter doing chart review, review of outside records, patient visit and documentation            Dr. Hall for lumbar spine.     No follow-ups on file.    Parker Corona DPM  [facility]   Ramana is a 65 year old who presents for the following health issues  accompanied by his spouse:    HPI     Encounter Diagnoses   Name Primary?     Right foot pain Yes     Metatarsalgia of right foot      He relates that the pain has gotten slightly better with PT. Relates that he would like to try orthotics. PT thinks that some of this may be coming from his back. Pain under the 2nd met head.     Review of Systems   Constitutional, HEENT, cardiovascular, pulmonary, gi and gu systems are negative, except as otherwise noted.      Objective           Vitals:  No vitals were obtained today due to virtual visit.    Physical Exam   GENERAL: Healthy, alert and no distress  EYES: Eyes grossly normal to inspection.  No discharge or erythema, or obvious scleral/conjunctival abnormalities.  RESP: No audible wheeze, cough, or visible cyanosis.  No visible retractions or increased work of breathing.    SKIN: Visible skin clear. No significant rash, abnormal pigmentation or lesions.  NEURO: Cranial nerves grossly intact.  Mentation and speech appropriate for age.  PSYCH: Mentation appears normal, affect normal/bright, judgement  and insight intact, normal speech and appearance well-groomed.          Video-Visit Details    Type of service:  Video Visit    Video End Time:1540    Originating Location (pt. Location): Home    Distant Location (provider location):  [facility]     Platform used for Video Visit: [employer]

## 2021-09-18 ENCOUNTER — HEALTH MAINTENANCE LETTER (OUTPATIENT)
Age: 66
End: 2021-09-18

## 2022-01-08 ENCOUNTER — HEALTH MAINTENANCE LETTER (OUTPATIENT)
Age: 67
End: 2022-01-08

## 2022-11-20 ENCOUNTER — HEALTH MAINTENANCE LETTER (OUTPATIENT)
Age: 67
End: 2022-11-20

## 2023-04-15 ENCOUNTER — HEALTH MAINTENANCE LETTER (OUTPATIENT)
Age: 68
End: 2023-04-15